# Patient Record
Sex: MALE | Race: WHITE | NOT HISPANIC OR LATINO | URBAN - METROPOLITAN AREA
[De-identification: names, ages, dates, MRNs, and addresses within clinical notes are randomized per-mention and may not be internally consistent; named-entity substitution may affect disease eponyms.]

---

## 2023-05-27 ENCOUNTER — HOSPITAL ENCOUNTER (EMERGENCY)
Facility: HOSPITAL | Age: 53
Discharge: HOME | End: 2023-05-27
Attending: STUDENT IN AN ORGANIZED HEALTH CARE EDUCATION/TRAINING PROGRAM
Payer: COMMERCIAL

## 2023-05-27 VITALS
HEART RATE: 89 BPM | SYSTOLIC BLOOD PRESSURE: 178 MMHG | RESPIRATION RATE: 20 BRPM | DIASTOLIC BLOOD PRESSURE: 99 MMHG | TEMPERATURE: 99 F | OXYGEN SATURATION: 96 %

## 2023-05-27 DIAGNOSIS — S09.90XA INJURY OF HEAD, INITIAL ENCOUNTER: ICD-10-CM
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
## 2023-05-27 DIAGNOSIS — T07.XXXA MULTIPLE ABRASIONS: ICD-10-CM

## 2023-05-27 DIAGNOSIS — F10.920 ALCOHOLIC INTOXICATION WITHOUT COMPLICATION: Primary | ICD-10-CM

## 2023-05-27 LAB
ALBUMIN SERPL BCP-MCNC: 3.6 G/DL (ref 3.5–5.2)
ALP SERPL-CCNC: 58 U/L (ref 55–135)
ALT SERPL W/O P-5'-P-CCNC: 34 U/L (ref 10–44)
AMPHET+METHAMPHET UR QL: NEGATIVE
ANION GAP SERPL CALC-SCNC: 16 MMOL/L (ref 8–16)
AST SERPL-CCNC: 43 U/L (ref 10–40)
BARBITURATES UR QL SCN>200 NG/ML: NEGATIVE
BASOPHILS # BLD AUTO: 0.06 K/UL (ref 0–0.2)
BASOPHILS NFR BLD: 1.1 % (ref 0–1.9)
BENZODIAZ UR QL SCN>200 NG/ML: NEGATIVE
BILIRUB SERPL-MCNC: 0.5 MG/DL (ref 0.1–1)
BUN SERPL-MCNC: 16 MG/DL (ref 6–20)
BZE UR QL SCN: NEGATIVE
CALCIUM SERPL-MCNC: 8.7 MG/DL (ref 8.7–10.5)
CANNABINOIDS UR QL SCN: NEGATIVE
CHLORIDE SERPL-SCNC: 102 MMOL/L (ref 95–110)
CO2 SERPL-SCNC: 16 MMOL/L (ref 23–29)
CREAT SERPL-MCNC: 1.2 MG/DL (ref 0.5–1.4)
CREAT UR-MCNC: 46 MG/DL (ref 23–375)
DIFFERENTIAL METHOD: ABNORMAL
EOSINOPHIL # BLD AUTO: 0.1 K/UL (ref 0–0.5)
EOSINOPHIL NFR BLD: 2.6 % (ref 0–8)
ERYTHROCYTE [DISTWIDTH] IN BLOOD BY AUTOMATED COUNT: 16.1 % (ref 11.5–14.5)
EST. GFR  (NO RACE VARIABLE): >60 ML/MIN/1.73 M^2
ETHANOL SERPL-MCNC: 170 MG/DL
ETHANOL SERPL-MCNC: 267 MG/DL
GLUCOSE SERPL-MCNC: 216 MG/DL (ref 70–110)
HCT VFR BLD AUTO: 42.7 % (ref 40–54)
HGB BLD-MCNC: 14.2 G/DL (ref 14–18)
IMM GRANULOCYTES # BLD AUTO: 0.03 K/UL (ref 0–0.04)
IMM GRANULOCYTES NFR BLD AUTO: 0.6 % (ref 0–0.5)
LYMPHOCYTES # BLD AUTO: 1.3 K/UL (ref 1–4.8)
LYMPHOCYTES NFR BLD: 24.2 % (ref 18–48)
MCH RBC QN AUTO: 28.5 PG (ref 27–31)
MCHC RBC AUTO-ENTMCNC: 33.3 G/DL (ref 32–36)
MCV RBC AUTO: 86 FL (ref 82–98)
METHADONE UR QL SCN>300 NG/ML: NEGATIVE
MONOCYTES # BLD AUTO: 0.4 K/UL (ref 0.3–1)
MONOCYTES NFR BLD: 7.7 % (ref 4–15)
NEUTROPHILS # BLD AUTO: 3.4 K/UL (ref 1.8–7.7)
NEUTROPHILS NFR BLD: 63.8 % (ref 38–73)
NRBC BLD-RTO: 0 /100 WBC
OPIATES UR QL SCN: NEGATIVE
PCP UR QL SCN>25 NG/ML: NEGATIVE
PLATELET # BLD AUTO: 200 K/UL (ref 150–450)
PMV BLD AUTO: 9.3 FL (ref 9.2–12.9)
POTASSIUM SERPL-SCNC: 4.5 MMOL/L (ref 3.5–5.1)
PROT SERPL-MCNC: 6.7 G/DL (ref 6–8.4)
RBC # BLD AUTO: 4.99 M/UL (ref 4.6–6.2)
SODIUM SERPL-SCNC: 134 MMOL/L (ref 136–145)
TOXICOLOGY INFORMATION: NORMAL
TROPONIN I SERPL DL<=0.01 NG/ML-MCNC: 0.01 NG/ML (ref 0–0.03)
WBC # BLD AUTO: 5.3 K/UL (ref 3.9–12.7)

## 2023-05-27 PROCEDURE — 90715 TDAP VACCINE 7 YRS/> IM: CPT | Performed by: STUDENT IN AN ORGANIZED HEALTH CARE EDUCATION/TRAINING PROGRAM

## 2023-05-27 PROCEDURE — 99284 PR EMERGENCY DEPT VISIT,LEVEL IV: ICD-10-PCS | Mod: FS,,, | Performed by: STUDENT IN AN ORGANIZED HEALTH CARE EDUCATION/TRAINING PROGRAM

## 2023-05-27 PROCEDURE — 96360 HYDRATION IV INFUSION INIT: CPT

## 2023-05-27 PROCEDURE — 93005 ELECTROCARDIOGRAM TRACING: CPT

## 2023-05-27 PROCEDURE — 90471 IMMUNIZATION ADMIN: CPT | Performed by: STUDENT IN AN ORGANIZED HEALTH CARE EDUCATION/TRAINING PROGRAM

## 2023-05-27 PROCEDURE — 25000003 PHARM REV CODE 250: Performed by: PHYSICIAN ASSISTANT

## 2023-05-27 PROCEDURE — 99285 EMERGENCY DEPT VISIT HI MDM: CPT | Mod: 25

## 2023-05-27 PROCEDURE — 93010 ELECTROCARDIOGRAM REPORT: CPT | Mod: ,,, | Performed by: STUDENT IN AN ORGANIZED HEALTH CARE EDUCATION/TRAINING PROGRAM

## 2023-05-27 PROCEDURE — 80053 COMPREHEN METABOLIC PANEL: CPT | Performed by: PHYSICIAN ASSISTANT

## 2023-05-27 PROCEDURE — 84484 ASSAY OF TROPONIN QUANT: CPT | Performed by: PHYSICIAN ASSISTANT

## 2023-05-27 PROCEDURE — 93010 EKG 12-LEAD: ICD-10-PCS | Mod: ,,, | Performed by: STUDENT IN AN ORGANIZED HEALTH CARE EDUCATION/TRAINING PROGRAM

## 2023-05-27 PROCEDURE — 63600175 PHARM REV CODE 636 W HCPCS: Performed by: STUDENT IN AN ORGANIZED HEALTH CARE EDUCATION/TRAINING PROGRAM

## 2023-05-27 PROCEDURE — 82077 ASSAY SPEC XCP UR&BREATH IA: CPT | Mod: 91 | Performed by: PHYSICIAN ASSISTANT

## 2023-05-27 PROCEDURE — 85025 COMPLETE CBC W/AUTO DIFF WBC: CPT | Performed by: PHYSICIAN ASSISTANT

## 2023-05-27 PROCEDURE — 99284 EMERGENCY DEPT VISIT MOD MDM: CPT | Mod: FS,,, | Performed by: STUDENT IN AN ORGANIZED HEALTH CARE EDUCATION/TRAINING PROGRAM

## 2023-05-27 PROCEDURE — 80307 DRUG TEST PRSMV CHEM ANLYZR: CPT | Performed by: PHYSICIAN ASSISTANT

## 2023-05-27 RX ORDER — LISINOPRIL 20 MG/1
40 TABLET ORAL
Status: COMPLETED | OUTPATIENT
Start: 2023-05-27 | End: 2023-05-27

## 2023-05-27 RX ORDER — ACETAMINOPHEN 500 MG
1000 TABLET ORAL
Status: COMPLETED | OUTPATIENT
Start: 2023-05-27 | End: 2023-05-27

## 2023-05-27 RX ADMIN — LISINOPRIL 40 MG: 20 TABLET ORAL at 05:05

## 2023-05-27 RX ADMIN — TETANUS TOXOID, REDUCED DIPHTHERIA TOXOID AND ACELLULAR PERTUSSIS VACCINE, ADSORBED 0.5 ML: 5; 2.5; 8; 8; 2.5 SUSPENSION INTRAMUSCULAR at 05:05

## 2023-05-27 RX ADMIN — SODIUM CHLORIDE 1000 ML: 9 INJECTION, SOLUTION INTRAVENOUS at 03:05

## 2023-05-27 RX ADMIN — ACETAMINOPHEN 1000 MG: 500 TABLET ORAL at 04:05

## 2023-05-27 NOTE — ED PROVIDER NOTES
Encounter Date: 5/27/2023       History     Chief Complaint   Patient presents with    Fall     Fall today @ 1415 witnessed by bystanders. Unknown LOC. Pt is alert, but confused. Unknown blood thinner use. Possible substance on board. Lac on bridge of nose, left eye. Skin tears on both knees.       53-year-old male with pmhx of hypertension presents to ED via EMS after fall and head injury occurring shortly before arrival.  He does not remember falling and believes he experienced a brief loss of consciousness. He admits to drinking whiskey and gambling earlier today.  He is a daily drinker.  He denies headache, neck pain, chest pain, shortness of breath, abdominal pain.     The history is provided by the EMS personnel and the patient.   Review of patient's allergies indicates:  Not on File  No past medical history on file.  No past surgical history on file.  No family history on file.     Review of Systems    Physical Exam     Initial Vitals [05/27/23 1510]   BP Pulse Resp Temp SpO2   (!) 175/100 108 16 98.8 °F (37.1 °C) 100 %      MAP       --         Physical Exam    Nursing note and vitals reviewed.  Constitutional: He appears well-developed. He is not diaphoretic. No distress.   HENT:   Head: Normocephalic. Head is with abrasion. Head is without raccoon's eyes.   Eyes: Conjunctivae and EOM are normal. Pupils are equal, round, and reactive to light.   Neck: Neck supple.   Normal range of motion.  Cardiovascular:  Normal rate, regular rhythm, normal heart sounds and intact distal pulses.           Pulmonary/Chest: Breath sounds normal. No respiratory distress.   Abdominal: Abdomen is soft. He exhibits no distension. There is no abdominal tenderness. There is no guarding.   Musculoskeletal:      Cervical back: Normal range of motion and neck supple.      Right knee: No swelling or ecchymosis. Normal range of motion. No tenderness.      Left knee: No swelling or ecchymosis. Normal range of motion. No tenderness.       Right foot: Normal pulse.      Left foot: Normal pulse.     Neurological: He is alert and oriented to person, place, and time. Gait normal.   Skin: Abrasion (knees bilaterally, bridge of nose, L side of forehead) noted.       ED Course   Procedures  Labs Reviewed   ALCOHOL,MEDICAL (ETHANOL) - Abnormal; Notable for the following components:       Result Value    Alcohol, Serum 267 (*)     All other components within normal limits   CBC W/ AUTO DIFFERENTIAL - Abnormal; Notable for the following components:    RDW 16.1 (*)     Immature Granulocytes 0.6 (*)     All other components within normal limits   COMPREHENSIVE METABOLIC PANEL - Abnormal; Notable for the following components:    Sodium 134 (*)     CO2 16 (*)     Glucose 216 (*)     AST 43 (*)     All other components within normal limits   ALCOHOL,MEDICAL (ETHANOL) - Abnormal; Notable for the following components:    Alcohol, Serum 170 (*)     All other components within normal limits   DRUG SCREEN PANEL, URINE EMERGENCY    Narrative:     Specimen Source->Urine   TROPONIN I     EKG Readings: (Independently Interpreted)   Initial Reading: No STEMI. Rhythm: Normal Sinus Rhythm. Heart Rate: 91. Ectopy: No Ectopy. Conduction: Normal. Axis: Normal. Clinical Impression: Normal Sinus Rhythm     Imaging Results              CT Head Without Contrast (Final result)  Result time 05/27/23 19:12:46      Final result by Zhen Leal MD (05/27/23 19:12:46)                   Impression:      1. No acute intracranial abnormalities.  2. No acute displaced fracture or dislocation of the maxillofacial region.  3. Sinus disease as above.      Electronically signed by: Zhen Leal MD  Date:    05/27/2023  Time:    19:12               Narrative:    EXAMINATION:  CT HEAD WITHOUT CONTRAST; CT MAXILLOFACIAL WITHOUT CONTRAST    CLINICAL HISTORY:  Head trauma, abnormal mental status (Age 19-64y);; Facial trauma, blunt;    TECHNIQUE:  Low dose axial images were obtained through  the head.  Coronal and sagittal reformations were also performed. Contrast was not administered.  Axial images of the maxillofacial region were obtained at 1.25 mm intervals without administration of IV contrast.  Coronal and sagittal reformatted images were reviewed.    COMPARISON:  None.    FINDINGS:  There is no evidence of acute major vascular territory infarct, hemorrhage, or mass.  There is no hydrocephalus.  There are no abnormal extra-axial fluid collections.  No acute displaced calvarial fracture.  The bilateral orbital walls, maxillary sinus walls, and zygomatic arches are intact.  The bilateral mandibular condyles are in appropriate location.  The mandible is intact.  No acute displaced fracture or dislocation of the maxillofacial region.  There is multilevel mandibular and maxillary dental disease.  There are degenerative changes of the cervical spine, the visualized portions of the cervical spine appear intact.  The visualized soft tissues of the neck are grossly unremarkable.  No sialolithiasis.  There is opacification of the left maxillary sinus as well as fluid layering within the right maxillary sinus.  The bilateral globes and orbital content are unremarkable.                                       CT Maxillofacial Without Contrast (Final result)  Result time 05/27/23 19:12:46      Final result by Zhen Leal MD (05/27/23 19:12:46)                   Impression:      1. No acute intracranial abnormalities.  2. No acute displaced fracture or dislocation of the maxillofacial region.  3. Sinus disease as above.      Electronically signed by: Zhen Leal MD  Date:    05/27/2023  Time:    19:12               Narrative:    EXAMINATION:  CT HEAD WITHOUT CONTRAST; CT MAXILLOFACIAL WITHOUT CONTRAST    CLINICAL HISTORY:  Head trauma, abnormal mental status (Age 19-64y);; Facial trauma, blunt;    TECHNIQUE:  Low dose axial images were obtained through the head.  Coronal and sagittal reformations were  also performed. Contrast was not administered.  Axial images of the maxillofacial region were obtained at 1.25 mm intervals without administration of IV contrast.  Coronal and sagittal reformatted images were reviewed.    COMPARISON:  None.    FINDINGS:  There is no evidence of acute major vascular territory infarct, hemorrhage, or mass.  There is no hydrocephalus.  There are no abnormal extra-axial fluid collections.  No acute displaced calvarial fracture.  The bilateral orbital walls, maxillary sinus walls, and zygomatic arches are intact.  The bilateral mandibular condyles are in appropriate location.  The mandible is intact.  No acute displaced fracture or dislocation of the maxillofacial region.  There is multilevel mandibular and maxillary dental disease.  There are degenerative changes of the cervical spine, the visualized portions of the cervical spine appear intact.  The visualized soft tissues of the neck are grossly unremarkable.  No sialolithiasis.  There is opacification of the left maxillary sinus as well as fluid layering within the right maxillary sinus.  The bilateral globes and orbital content are unremarkable.                                       CT Cervical Spine Without Contrast (Final result)  Result time 05/27/23 19:57:55      Final result by Gunnar Cr MD (05/27/23 19:57:55)                   Impression:      1. No acute fracture or traumatic malalignment of the cervical spine.  2. Multilevel degenerative changes most pronounced at C5-C6 with severe spinal canal stenosis and moderate bilateral neural foraminal narrowing.    Electronically signed by resident: Myra Martinez  Date:    05/27/2023  Time:    19:16    Electronically signed by: Gunnar Cr MD  Date:    05/27/2023  Time:    19:57               Narrative:    EXAMINATION:  CT CERVICAL SPINE WITHOUT CONTRAST    CLINICAL HISTORY:  Neck trauma, intoxicated or obtunded (Age >= 16y);    TECHNIQUE:  Low dose axial images, sagittal and  coronal reformations were performed though the cervical spine.  Contrast was not administered.    COMPARISON:  None.    FINDINGS:  Cervical spine:    Alignment: Straightening of the normal cervical lordosis.    Vertebrae: The dens, lateral masses, and occipital condyles are intact.  No fracture.  Linear lucency through the anterior osteophyte of the C5 vertebral body with well corticated margins, favoring chronic finding.    Discs: Mild multilevel disc height loss most pronounced at C5-C6.    Degenerative findings: Multilevel facet arthropathy, uncovertebral spurring, and posterior disc osteophyte complexes at C3-C4, C4-C5, C5-C6, and C6-C7.  Findings result in moderate C3-C4, moderate C4-C5, severe C5-C6, and moderate C6-C7 canal stenosis.  Findings also result in mild left C3-C4, mild right C4-C5, moderate bilateral C5-C6, and mild bilateral C6-C7 neural foraminal narrowing.    Paraspinal muscles & soft tissues: Normal.    The visualized lung apices are unremarkable.  There is no evidence of a pneumothorax or pulmonary contusion.                                       X-Ray Chest AP Portable (Final result)  Result time 05/27/23 18:36:31      Final result by Gunnar Cr MD (05/27/23 18:36:31)                   Impression:      No acute cardiopulmonary process.    Electronically signed by resident: Andrew Ritchie  Date:    05/27/2023  Time:    18:29    Electronically signed by: Gunnar Cr MD  Date:    05/27/2023  Time:    18:36               Narrative:    EXAMINATION:  XR CHEST AP PORTABLE    CLINICAL HISTORY:  fall;    TECHNIQUE:  Single frontal view of the chest was performed.    COMPARISON:  None    FINDINGS:  Monitoring ECG leads overlie the chest.    Mediastinal structures are midline. Cardiomediastinal silhouette is normal in size.    Lung volumes are symmetric. The lungs are clear. No focal consolidation.  No pleural effusion. No pneumothorax.    Soft tissues are within normal limits.  Osseous structures are  intact.                                       X-Ray Knee 3 View Bilateral (Final result)  Result time 05/27/23 18:23:12      Final result by Zhen Leal MD (05/27/23 18:23:12)                   Impression:      1. No convincing acute displaced fracture or dislocation of the left or right knee noting small suprapatellar effusions.  Further evaluation as warranted.      Electronically signed by: Zhen Leal MD  Date:    05/27/2023  Time:    18:23               Narrative:    EXAMINATION:  XR KNEE 3 VIEW BILATERAL    CLINICAL HISTORY:  Unspecified fall, initial encounter    TECHNIQUE:  AP, lateral, and Merchant views of both knees were performed.    COMPARISON:  None    FINDINGS:  Four views bilateral knees.    There are degenerative changes of the knees, tricompartmental.  There is bilateral medial and lateral compartmental narrowing.  No acute displaced fracture or dislocation of the left or right knee.  There are small bilateral suprapatellar effusions.  There is cortical irregularity along the medial aspect of the left patella, suggest sequela of prior injury.  No comparative examinations are available for review.                                       Medications   sodium chloride 0.9% bolus 1,000 mL 1,000 mL (0 mLs Intravenous Stopped 5/27/23 1700)   acetaminophen tablet 1,000 mg (1,000 mg Oral Given 5/27/23 1617)   Tdap (BOOSTRIX) vaccine injection 0.5 mL (0.5 mLs Intramuscular Given 5/27/23 1751)   lisinopriL tablet 40 mg (40 mg Oral Given 5/27/23 1751)     Medical Decision Making:   History:   I obtained history from: EMS provider.  Initial Assessment:   53-year-old male with pmhx of hypertension presents to ED via EMS after fall and head injury occurring shortly before arrival.  He is alert and oriented x3.  Appears intoxicated. He is nontoxic appearing. Neurovascularly intact. Hemodynamically stable. I will initiate workup and reassess.  Differential Diagnosis:   Acute alcohol intoxication, subdural  hematoma, fracture, ACS  Independently Interpreted Test(s):   I have ordered and independently interpreted EKG Reading(s) - see prior notes  Clinical Tests:   Lab Tests: Ordered and Reviewed  Radiological Study: Ordered and Reviewed  Medical Tests: Ordered and Reviewed  ED Management:  - Serum alcohol level elevated and consistent with acute alcohol intoxication.  IV fluids given.  Down trending from 267 to 170.  Appears clinically sober at this time.  He is tolerating p.o.-- eating and drinking. States that he is daily drinker.  Walking around the emergency department unassisted.  He does not have family or friends that live near and he lives by himself.  He would like to order a Cab/Lyft to bring him home.  I believe that would be appropriate as he is Oriented x3, conversing appropriately, normal coordination. Confirmed that he ordered Lyft. Cooperating with workup. I believe he is stable at this time for discharge as clinically sober.  Strict ED precautions given to return for new or worsening symptoms    - Drug screen negative  - Several abrasions, tetanus updated  - CT head, maxillofacial, and neck negative for acute finding  - Bilateral knee x-rays significant for suprapatellar effusions without fracture.    - BG of 216. No hypoglycemia.  No significant electrolyte derangement  - EKG negative for infarct.  Troponin negative. Denies chest pain or shortness of breath.  Chest x-ray negative for acute findings    Other:   I discussed test(s) with the performing physician.           ED Course as of 05/27/23 2346   Sat May 27, 2023   1658 Drug screen panel, emergency  Negative [HM]   1658 Alcohol, Serum(!): 267 [HM]   1658 Troponin I: 0.011 [HM]   1658 Glucose(!): 216 [HM]      ED Course User Index  [HM] Nury Solis PA-C                 Clinical Impression:   Final diagnoses:  [W19.XXXA] Fall  [F10.920] Alcoholic intoxication without complication (Primary)  [S09.90XA] Injury of head, initial  encounter  [T07.XXXA] Multiple abrasions        ED Disposition Condition    Discharge Stable          ED Prescriptions    None       Follow-up Information       Follow up With Specialties Details Why Contact Info    [facility] Dept Emergency Medicine  If symptoms worsen [address]  [facility] [id number]  [phone]             Nury Solis PA-C  05/27/23 9110